# Patient Record
Sex: MALE | Race: WHITE | NOT HISPANIC OR LATINO | URBAN - METROPOLITAN AREA
[De-identification: names, ages, dates, MRNs, and addresses within clinical notes are randomized per-mention and may not be internally consistent; named-entity substitution may affect disease eponyms.]

---

## 2023-04-21 DIAGNOSIS — E11.9 TYPE 2 DIABETES MELLITUS WITHOUT COMPLICATIONS (MULTI): ICD-10-CM

## 2023-04-21 RX ORDER — METFORMIN HYDROCHLORIDE 1000 MG/1
TABLET ORAL
Qty: 180 TABLET | Refills: 3 | Status: SHIPPED | OUTPATIENT
Start: 2023-04-21 | End: 2024-04-22

## 2023-06-12 DIAGNOSIS — I10 ESSENTIAL (PRIMARY) HYPERTENSION: ICD-10-CM

## 2023-06-13 RX ORDER — AMLODIPINE BESYLATE AND ATORVASTATIN CALCIUM 5; 20 MG/1; MG/1
TABLET, FILM COATED ORAL
Qty: 90 TABLET | Refills: 3 | Status: SHIPPED | OUTPATIENT
Start: 2023-06-13

## 2023-07-10 ENCOUNTER — OFFICE VISIT (OUTPATIENT)
Dept: PRIMARY CARE | Facility: CLINIC | Age: 73
End: 2023-07-10
Payer: MEDICARE

## 2023-07-10 VITALS
OXYGEN SATURATION: 94 % | HEART RATE: 75 BPM | BODY MASS INDEX: 35.68 KG/M2 | WEIGHT: 222 LBS | SYSTOLIC BLOOD PRESSURE: 123 MMHG | DIASTOLIC BLOOD PRESSURE: 72 MMHG | TEMPERATURE: 97.9 F | RESPIRATION RATE: 16 BRPM | HEIGHT: 66 IN

## 2023-07-10 DIAGNOSIS — K40.90 LEFT GROIN HERNIA: ICD-10-CM

## 2023-07-10 DIAGNOSIS — E13.9 DIABETES 1.5, MANAGED AS TYPE 2 (MULTI): ICD-10-CM

## 2023-07-10 DIAGNOSIS — Z79.899 HIGH RISK MEDICATION USE: ICD-10-CM

## 2023-07-10 DIAGNOSIS — R53.83 OTHER FATIGUE: ICD-10-CM

## 2023-07-10 DIAGNOSIS — N40.1 BENIGN PROSTATIC HYPERPLASIA WITH NOCTURIA: Primary | ICD-10-CM

## 2023-07-10 DIAGNOSIS — R35.1 BENIGN PROSTATIC HYPERPLASIA WITH NOCTURIA: Primary | ICD-10-CM

## 2023-07-10 DIAGNOSIS — E78.5 DYSLIPIDEMIA: ICD-10-CM

## 2023-07-10 DIAGNOSIS — R25.2 BILATERAL LEG CRAMPS: ICD-10-CM

## 2023-07-10 DIAGNOSIS — J30.2 SEASONAL ALLERGIC RHINITIS, UNSPECIFIED TRIGGER: ICD-10-CM

## 2023-07-10 DIAGNOSIS — M17.0 PRIMARY OSTEOARTHRITIS OF BOTH KNEES: ICD-10-CM

## 2023-07-10 DIAGNOSIS — E03.9 HYPOTHYROIDISM, UNSPECIFIED TYPE: ICD-10-CM

## 2023-07-10 LAB
ALANINE AMINOTRANSFERASE (SGPT) (U/L) IN SER/PLAS: 10 U/L (ref 10–52)
ALBUMIN (G/DL) IN SER/PLAS: 4.5 G/DL (ref 3.4–5)
ALKALINE PHOSPHATASE (U/L) IN SER/PLAS: 69 U/L (ref 33–136)
ANION GAP IN SER/PLAS: 17 MMOL/L (ref 10–20)
ASPARTATE AMINOTRANSFERASE (SGOT) (U/L) IN SER/PLAS: 12 U/L (ref 9–39)
BILIRUBIN TOTAL (MG/DL) IN SER/PLAS: 0.7 MG/DL (ref 0–1.2)
CALCIUM (MG/DL) IN SER/PLAS: 9.6 MG/DL (ref 8.6–10.6)
CARBON DIOXIDE, TOTAL (MMOL/L) IN SER/PLAS: 24 MMOL/L (ref 21–32)
CHLORIDE (MMOL/L) IN SER/PLAS: 102 MMOL/L (ref 98–107)
CHOLESTEROL (MG/DL) IN SER/PLAS: 120 MG/DL (ref 0–199)
CHOLESTEROL IN HDL (MG/DL) IN SER/PLAS: 36.4 MG/DL
CHOLESTEROL/HDL RATIO: 3.3
CREATININE (MG/DL) IN SER/PLAS: 1.22 MG/DL (ref 0.5–1.3)
ERYTHROCYTE DISTRIBUTION WIDTH (RATIO) BY AUTOMATED COUNT: 12.4 % (ref 11.5–14.5)
ERYTHROCYTE MEAN CORPUSCULAR HEMOGLOBIN CONCENTRATION (G/DL) BY AUTOMATED: 32.8 G/DL (ref 32–36)
ERYTHROCYTE MEAN CORPUSCULAR VOLUME (FL) BY AUTOMATED COUNT: 87 FL (ref 80–100)
ERYTHROCYTES (10*6/UL) IN BLOOD BY AUTOMATED COUNT: 5.04 X10E12/L (ref 4.5–5.9)
GFR MALE: 63 ML/MIN/1.73M2
GLUCOSE (MG/DL) IN SER/PLAS: 119 MG/DL (ref 74–99)
HEMATOCRIT (%) IN BLOOD BY AUTOMATED COUNT: 43.9 % (ref 41–52)
HEMOGLOBIN (G/DL) IN BLOOD: 14.4 G/DL (ref 13.5–17.5)
LDL: 59 MG/DL (ref 0–99)
LEUKOCYTES (10*3/UL) IN BLOOD BY AUTOMATED COUNT: 7.1 X10E9/L (ref 4.4–11.3)
NRBC (PER 100 WBCS) BY AUTOMATED COUNT: 0 /100 WBC (ref 0–0)
PLATELETS (10*3/UL) IN BLOOD AUTOMATED COUNT: 253 X10E9/L (ref 150–450)
POC FINGERSTICK BLOOD GLUCOSE: 148 MG/DL (ref 70–100)
POTASSIUM (MMOL/L) IN SER/PLAS: 4.3 MMOL/L (ref 3.5–5.3)
PROTEIN TOTAL: 6.9 G/DL (ref 6.4–8.2)
SODIUM (MMOL/L) IN SER/PLAS: 139 MMOL/L (ref 136–145)
TRIGLYCERIDE (MG/DL) IN SER/PLAS: 125 MG/DL (ref 0–149)
UREA NITROGEN (MG/DL) IN SER/PLAS: 24 MG/DL (ref 6–23)
VLDL: 25 MG/DL (ref 0–40)

## 2023-07-10 PROCEDURE — 82962 GLUCOSE BLOOD TEST: CPT | Performed by: FAMILY MEDICINE

## 2023-07-10 PROCEDURE — 83036 HEMOGLOBIN GLYCOSYLATED A1C: CPT | Performed by: FAMILY MEDICINE

## 2023-07-10 PROCEDURE — 3078F DIAST BP <80 MM HG: CPT | Performed by: FAMILY MEDICINE

## 2023-07-10 PROCEDURE — 80053 COMPREHEN METABOLIC PANEL: CPT

## 2023-07-10 PROCEDURE — 1126F AMNT PAIN NOTED NONE PRSNT: CPT | Performed by: FAMILY MEDICINE

## 2023-07-10 PROCEDURE — 99214 OFFICE O/P EST MOD 30 MIN: CPT | Performed by: FAMILY MEDICINE

## 2023-07-10 PROCEDURE — 85027 COMPLETE CBC AUTOMATED: CPT

## 2023-07-10 PROCEDURE — 3074F SYST BP LT 130 MM HG: CPT | Performed by: FAMILY MEDICINE

## 2023-07-10 PROCEDURE — 1160F RVW MEDS BY RX/DR IN RCRD: CPT | Performed by: FAMILY MEDICINE

## 2023-07-10 PROCEDURE — 1159F MED LIST DOCD IN RCRD: CPT | Performed by: FAMILY MEDICINE

## 2023-07-10 PROCEDURE — 1036F TOBACCO NON-USER: CPT | Performed by: FAMILY MEDICINE

## 2023-07-10 PROCEDURE — 80061 LIPID PANEL: CPT

## 2023-07-10 RX ORDER — TAMSULOSIN HYDROCHLORIDE 0.4 MG/1
0.4 CAPSULE ORAL DAILY
Qty: 30 CAPSULE | Refills: 11 | Status: SHIPPED | OUTPATIENT
Start: 2023-07-10 | End: 2023-07-27

## 2023-07-10 RX ORDER — BLOOD-GLUCOSE METER
EACH MISCELLANEOUS
COMMUNITY
Start: 2023-01-24

## 2023-07-10 RX ORDER — BLOOD SUGAR DIAGNOSTIC
STRIP MISCELLANEOUS
COMMUNITY
End: 2023-10-27

## 2023-07-10 RX ORDER — OLMESARTAN MEDOXOMIL AND HYDROCHLOROTHIAZIDE 20/12.5 20; 12.5 MG/1; MG/1
1 TABLET ORAL DAILY
COMMUNITY
End: 2023-07-24

## 2023-07-10 RX ORDER — LEVOTHYROXINE SODIUM 50 UG/1
50 TABLET ORAL DAILY
COMMUNITY
End: 2024-03-22

## 2023-07-10 RX ORDER — LANCETS
EACH MISCELLANEOUS
COMMUNITY
Start: 2023-05-15 | End: 2024-02-05

## 2023-07-10 ASSESSMENT — PAIN SCALES - GENERAL: PAINLEVEL: 0-NO PAIN

## 2023-07-10 NOTE — PROGRESS NOTES
Subjective   Mack Alarcon is a 73 y.o. male who presents for Follow-up (Patient is diabetic).    HPI  : Patient is a 73-year-old diabetic male who is in for 6-month checkup for her blood sugar and A1c, also complete blood work including lipids and blood counts.  Patient does check his sugar at home and is trying to watch his diet better and he has lost about 6 pounds.  Patient does have a large groin hernia but he states he is not ready to have it fixed yet he is waiting till wintertime.  He also has an elevated PSA and has seen urology for this in the past.  He would like to try taking Flomax to see if it cuts down in his nighttime urination.  Patient's blood pressure has been stable and he will continue watching his diet and trying to lose some weight.  He also has some arthritis in his knees and he gets achy knees but he thinks that is from going up and down steps at home.      Objective  : ROS : 10 systems were reviewed and the information is included in the HPI and no additional review of systems is indicated.    Physical Exam  Vitals and nursing note reviewed.   Constitutional:       Appearance: Normal appearance. He is obese.      Comments: Patient is alert and oriented x3 in no acute distress.   HENT:      Head: Normocephalic and atraumatic.      Right Ear: Tympanic membrane and ear canal normal.      Left Ear: Tympanic membrane and ear canal normal.      Nose: Rhinorrhea present.      Comments: Allergic nasal rhinitis.  Occasionally takes over-the-counter Claritin.     Mouth/Throat:      Mouth: Mucous membranes are dry.      Pharynx: Oropharynx is clear.      Comments: Mouth is dry , tongue is midline, no  posterior pharyngeal erythema  Eyes:      Extraocular Movements: Extraocular movements intact.      Conjunctiva/sclera: Conjunctivae normal.      Pupils: Pupils are equal, round, and reactive to light.      Comments: Patient denies any visual disturbance.  Does have a yearly eye exam.     Neck:       Comments: Patient has a large broad neck.  Denies any cervical adenopathy and no thyromegaly.  No carotid bruits noted.  Cardiovascular:      Rate and Rhythm: Normal rate and regular rhythm.      Heart sounds: Normal heart sounds. No murmur heard.     Comments: Patient denies any chest pain and no palpitations.     Heart rhythm is stable S1 and S2 are noted.  No murmurs or ectopics.   Pulmonary:      Effort: Pulmonary effort is normal.      Breath sounds: Normal breath sounds.      Comments: Lungs are clear to auscultation.       No coughing or wheezing noted.  Abdominal:      General: Bowel sounds are normal.      Palpations: Abdomen is soft.      Hernia: A hernia is present.      Comments: Patient denies any abdominal pain, no rebound tenderness and no guarding.  Denies any flank tenderness.  No suprapubic pain is noted.  Abdomen is soft with positive bowel sounds x4.  Patient does have a large left groin hernia but he does not want to have it repaired at this time.  He wants to wait till the winter.   Genitourinary:     Comments: Patient denies any flank tenderness, no dysuria and no hematuria.   Patient does have nocturia and an elevated PSA level.  He has seen urology in the past and wants to try Flomax.  Musculoskeletal:         General: No swelling or tenderness.      Cervical back: Normal range of motion and neck supple.      Comments: Restricted range of motion cervical , thoracic and lumbar spines.  Patient does have degenerative arthritis in his low back, both hips and knees.  He states his knees ache when he goes up and down the steps too many times.  Normal gait and ambulation.  Arthritis in the hands or fingers.   Patient also gets occasional leg cramps and will try some magnesium.   Skin:     General: Skin is warm and dry.      Capillary Refill: Capillary refill takes less than 2 seconds.      Comments: No bruising or skin lesions noted.  No rashes.   Neurological:      General: No focal deficit  present.      Mental Status: He is alert and oriented to person, place, and time. Mental status is at baseline.      Comments: Patient denies any focal neurologic deficits.      There is normal gait and normal coordination.  No muscle weakness noted.   No sensory deficits noted.  Reflexes are decreased ,  upper and lower extremities.  Patient does have some mild neuropathy in the lower extremities and both feet.   Psychiatric:         Mood and Affect: Mood normal.         Behavior: Behavior normal.         Thought Content: Thought content normal.         Judgment: Judgment normal.      Comments: Patient has normal mood and affect.  Denies any anxiety or depression.       Normal thought content and judgment.  Behavior is normal.   PLAN  : Patient is a 73-year-old diabetic male who was evaluated for complete blood work and also recheck on blood sugar and A1c.  Today his blood sugar was 148 and the A1c was stable at 7.3%.  Patient did lose about 6 pounds from last visit and is trying to start walking more and lose a little bit more weight.  Patient will be notified of all his blood results in 4 days and further recommendations will be made after review of the blood results.  Blood pressure and other vitals were stable.  He will try some Flomax for his BPH with nocturia and he also has an elevated PSA and has seen urology in the past.  Patient otherwise will follow-up in 6 months or sooner as needed.    Problem List Items Addressed This Visit    None           Buzz Vazquez,

## 2023-07-12 LAB — HBA1C MFR BLD: 7.3 % (ref 4.2–6.5)

## 2023-07-15 NOTE — RESULT ENCOUNTER NOTE
Liver function is normal,       kidney function was just a slight bit borderline and he needs to drink more water.       Cholesterol was very good at 120       triglycerides are very good at 125     Red and white blood cell counts are normal      and diabetes is stable.          Blood work looks stable just continue to watch the diet and control his diabetes.

## 2023-07-24 DIAGNOSIS — I10 ESSENTIAL (PRIMARY) HYPERTENSION: ICD-10-CM

## 2023-07-24 RX ORDER — OLMESARTAN MEDOXOMIL AND HYDROCHLOROTHIAZIDE 20/12.5 20; 12.5 MG/1; MG/1
1 TABLET ORAL DAILY
Qty: 90 TABLET | Refills: 2 | Status: SHIPPED | OUTPATIENT
Start: 2023-07-24 | End: 2024-05-20

## 2023-07-26 DIAGNOSIS — R35.1 BENIGN PROSTATIC HYPERPLASIA WITH NOCTURIA: ICD-10-CM

## 2023-07-26 DIAGNOSIS — N40.1 BENIGN PROSTATIC HYPERPLASIA WITH NOCTURIA: ICD-10-CM

## 2023-07-27 RX ORDER — TAMSULOSIN HYDROCHLORIDE 0.4 MG/1
0.4 CAPSULE ORAL DAILY
Qty: 90 CAPSULE | Refills: 4 | Status: SHIPPED | OUTPATIENT
Start: 2023-07-27

## 2023-10-27 DIAGNOSIS — E11.9 TYPE 2 DIABETES MELLITUS WITHOUT COMPLICATIONS (MULTI): ICD-10-CM

## 2023-10-27 RX ORDER — BLOOD SUGAR DIAGNOSTIC
STRIP MISCELLANEOUS
Qty: 200 STRIP | Refills: 3 | Status: SHIPPED | OUTPATIENT
Start: 2023-10-27

## 2024-01-08 ENCOUNTER — OFFICE VISIT (OUTPATIENT)
Dept: PRIMARY CARE | Facility: CLINIC | Age: 74
End: 2024-01-08
Payer: MEDICARE

## 2024-01-08 VITALS
HEIGHT: 66 IN | SYSTOLIC BLOOD PRESSURE: 133 MMHG | HEART RATE: 82 BPM | WEIGHT: 225 LBS | DIASTOLIC BLOOD PRESSURE: 70 MMHG | OXYGEN SATURATION: 94 % | RESPIRATION RATE: 16 BRPM | BODY MASS INDEX: 36.16 KG/M2 | TEMPERATURE: 97.7 F

## 2024-01-08 DIAGNOSIS — E78.5 DYSLIPIDEMIA: ICD-10-CM

## 2024-01-08 DIAGNOSIS — I10 PRIMARY HYPERTENSION: ICD-10-CM

## 2024-01-08 DIAGNOSIS — Z00.00 ROUTINE GENERAL MEDICAL EXAMINATION AT HEALTH CARE FACILITY: Primary | ICD-10-CM

## 2024-01-08 DIAGNOSIS — Z79.899 HIGH RISK MEDICATION USE: ICD-10-CM

## 2024-01-08 DIAGNOSIS — J06.9 URI WITH COUGH AND CONGESTION: ICD-10-CM

## 2024-01-08 DIAGNOSIS — E03.9 ACQUIRED HYPOTHYROIDISM: ICD-10-CM

## 2024-01-08 DIAGNOSIS — E13.9 DIABETES 1.5, MANAGED AS TYPE 2 (MULTI): ICD-10-CM

## 2024-01-08 LAB
ALBUMIN SERPL BCP-MCNC: 4.4 G/DL (ref 3.4–5)
ALP SERPL-CCNC: 62 U/L (ref 33–136)
ALT SERPL W P-5'-P-CCNC: 13 U/L (ref 10–52)
ANION GAP SERPL CALC-SCNC: 15 MMOL/L (ref 10–20)
AST SERPL W P-5'-P-CCNC: 10 U/L (ref 9–39)
BILIRUB SERPL-MCNC: 0.8 MG/DL (ref 0–1.2)
BUN SERPL-MCNC: 23 MG/DL (ref 6–23)
CALCIUM SERPL-MCNC: 9.7 MG/DL (ref 8.6–10.6)
CHLORIDE SERPL-SCNC: 102 MMOL/L (ref 98–107)
CHOLEST SERPL-MCNC: 122 MG/DL (ref 0–199)
CHOLESTEROL/HDL RATIO: 3.1
CO2 SERPL-SCNC: 27 MMOL/L (ref 21–32)
CREAT SERPL-MCNC: 1.24 MG/DL (ref 0.5–1.3)
EGFRCR SERPLBLD CKD-EPI 2021: 62 ML/MIN/1.73M*2
ERYTHROCYTE [DISTWIDTH] IN BLOOD BY AUTOMATED COUNT: 12.6 % (ref 11.5–14.5)
GLUCOSE SERPL-MCNC: 132 MG/DL (ref 74–99)
HBA1C MFR BLD: 7.6 % (ref 4.2–6.5)
HCT VFR BLD AUTO: 44.4 % (ref 41–52)
HDLC SERPL-MCNC: 39.5 MG/DL
HGB BLD-MCNC: 14.8 G/DL (ref 13.5–17.5)
LDLC SERPL CALC-MCNC: 60 MG/DL
MCH RBC QN AUTO: 29 PG (ref 26–34)
MCHC RBC AUTO-ENTMCNC: 33.3 G/DL (ref 32–36)
MCV RBC AUTO: 87 FL (ref 80–100)
NON HDL CHOLESTEROL: 83 MG/DL (ref 0–149)
NRBC BLD-RTO: 0 /100 WBCS (ref 0–0)
PLATELET # BLD AUTO: 241 X10*3/UL (ref 150–450)
POC FINGERSTICK BLOOD GLUCOSE: 159 MG/DL (ref 70–100)
POTASSIUM SERPL-SCNC: 4.3 MMOL/L (ref 3.5–5.3)
PROT SERPL-MCNC: 6.8 G/DL (ref 6.4–8.2)
RBC # BLD AUTO: 5.1 X10*6/UL (ref 4.5–5.9)
SODIUM SERPL-SCNC: 140 MMOL/L (ref 136–145)
TRIGL SERPL-MCNC: 112 MG/DL (ref 0–149)
TSH SERPL-ACNC: 3.15 MIU/L (ref 0.44–3.98)
VLDL: 22 MG/DL (ref 0–40)
WBC # BLD AUTO: 6.8 X10*3/UL (ref 4.4–11.3)

## 2024-01-08 PROCEDURE — 1126F AMNT PAIN NOTED NONE PRSNT: CPT | Performed by: FAMILY MEDICINE

## 2024-01-08 PROCEDURE — 3078F DIAST BP <80 MM HG: CPT | Performed by: FAMILY MEDICINE

## 2024-01-08 PROCEDURE — 82962 GLUCOSE BLOOD TEST: CPT | Performed by: FAMILY MEDICINE

## 2024-01-08 PROCEDURE — 1159F MED LIST DOCD IN RCRD: CPT | Performed by: FAMILY MEDICINE

## 2024-01-08 PROCEDURE — 80061 LIPID PANEL: CPT

## 2024-01-08 PROCEDURE — 3051F HG A1C>EQUAL 7.0%<8.0%: CPT | Performed by: FAMILY MEDICINE

## 2024-01-08 PROCEDURE — G0439 PPPS, SUBSEQ VISIT: HCPCS | Performed by: FAMILY MEDICINE

## 2024-01-08 PROCEDURE — 1036F TOBACCO NON-USER: CPT | Performed by: FAMILY MEDICINE

## 2024-01-08 PROCEDURE — 36415 COLL VENOUS BLD VENIPUNCTURE: CPT

## 2024-01-08 PROCEDURE — 84443 ASSAY THYROID STIM HORMONE: CPT

## 2024-01-08 PROCEDURE — 99214 OFFICE O/P EST MOD 30 MIN: CPT | Performed by: FAMILY MEDICINE

## 2024-01-08 PROCEDURE — 1170F FXNL STATUS ASSESSED: CPT | Performed by: FAMILY MEDICINE

## 2024-01-08 PROCEDURE — 85027 COMPLETE CBC AUTOMATED: CPT

## 2024-01-08 PROCEDURE — 80053 COMPREHEN METABOLIC PANEL: CPT

## 2024-01-08 PROCEDURE — 83036 HEMOGLOBIN GLYCOSYLATED A1C: CPT | Mod: CLIA WAIVED TEST | Performed by: FAMILY MEDICINE

## 2024-01-08 PROCEDURE — 1160F RVW MEDS BY RX/DR IN RCRD: CPT | Performed by: FAMILY MEDICINE

## 2024-01-08 PROCEDURE — 3075F SYST BP GE 130 - 139MM HG: CPT | Performed by: FAMILY MEDICINE

## 2024-01-08 ASSESSMENT — ENCOUNTER SYMPTOMS
LOSS OF SENSATION IN FEET: 0
DEPRESSION: 0
OCCASIONAL FEELINGS OF UNSTEADINESS: 0

## 2024-01-08 ASSESSMENT — ACTIVITIES OF DAILY LIVING (ADL)
BATHING: INDEPENDENT
DRESSING: INDEPENDENT
DRESSING: INDEPENDENT
BATHING: INDEPENDENT

## 2024-01-08 ASSESSMENT — PAIN SCALES - GENERAL: PAINLEVEL: 0-NO PAIN

## 2024-01-08 ASSESSMENT — PATIENT HEALTH QUESTIONNAIRE - PHQ9
SUM OF ALL RESPONSES TO PHQ9 QUESTIONS 1 AND 2: 0
1. LITTLE INTEREST OR PLEASURE IN DOING THINGS: NOT AT ALL
2. FEELING DOWN, DEPRESSED OR HOPELESS: NOT AT ALL

## 2024-01-08 NOTE — PROGRESS NOTES
"Subjective   Reason for Visit: Mack Alarcon is an 73 y.o. male here for a Medicare Wellness visit.        Reviewed all medications by prescribing practitioner or clinical pharmacist (such as prescriptions, OTCs, herbal therapies and supplements) and documented in the medical record.    HPI : Patient is a 73-year-old male who is in for his Medicare wellness subsequent visit.  He is also diabetic and hypertensive and we will be checking his blood sugar, A1c and complete blood work.  He is retired and enjoys his care home time off.  He denies any new problems and does try to watch his weight and his diet.  He also checks his blood sugar at home.  Patient will answer the questions as presented by the medical assistant.  He does have a living will and medical power of .    Patient Care Team:  Buzz Vazquez DO as PCP - General  Buzz Vazquez DO as PCP - [employer] PCP     Review of Systems : 10 systems were reviewed and the information is included in the HPI and no additional review of systems is indicated.    Objective   Vitals:  /70   Pulse 82   Temp 36.5 °C (97.7 °F)   Resp 16   Ht 1.676 m (5' 6\")   Wt 102 kg (225 lb)   SpO2 94%   BMI 36.32 kg/m²       Physical Exam  Vitals and nursing note reviewed.   Constitutional:       Appearance: Normal appearance. He is obese.      Comments: Patient is alert and oriented x3.   No acute distress and does try to follow a diabetic diet.   HENT:      Head: Normocephalic.      Right Ear: Tympanic membrane and external ear normal.      Left Ear: Tympanic membrane and external ear normal.      Ears:      Comments: Ears are patent bilaterally and TMs are clear.     Nose: Congestion present.      Comments: Patient has sinus congestion from a cold and also has postnasal drainage.     Mouth/Throat:      Mouth: Mucous membranes are moist.      Pharynx: Oropharynx is clear.      Comments: Mouth is moist, tongue is midline.  No posterior " pharyngeal erythema.  Eyes:      Extraocular Movements: Extraocular movements intact.      Conjunctiva/sclera: Conjunctivae normal.      Pupils: Pupils are equal, round, and reactive to light.      Comments: No visual disturbance, does have his  eyes examined once a year.   Neck:      Comments: Patient has a broad stout neck, with mild restriction of motion.  No carotid bruits, no thyromegaly, no cervical adenopathy.  Occasional neck spasm and restriction of motion secondary to arthritis, stress and tension.  Cardiovascular:      Rate and Rhythm: Normal rate and regular rhythm.      Pulses: Normal pulses.      Heart sounds: Normal heart sounds.      Comments: Patient denies chest pain and no palpitations.  Heart rhythm is stable S1 and S2 are noted, no ectopics.  Pulmonary:      Effort: Pulmonary effort is normal.      Breath sounds: Rhonchi present.      Comments: Patient has a slight chest cold and does have a mild cough with few scattered rhonchi.  No wheezing is noted the patient is not short of breath.   Abdominal:      General: Bowel sounds are normal.      Palpations: Abdomen is soft.      Comments: Abdomen is soft and mildly obese but non tender.No flank tenderness.  No suprapubic pain.  Positive bowel sounds x4.  No abdominal guarding and no rebound tenderness.   Genitourinary:     Comments: Patient denies dysuria, no hematuria, denies flank pain.  Patient does have nocturia.    Musculoskeletal:         General: Tenderness present.      Cervical back: Normal range of motion.      Comments: Restricted range of motion cervical thoracic and lumbar spines due to degenerative disc disease.  Also has arthritis in his knee joints but ambulates without any assistive device.  Age-related arthritis in the joints.    Skin:     General: Skin is warm and dry.      Comments: There is no bruising, no erythema, no skin lesions noted, no rashes.   Neurological:      General: No focal deficit present.      Mental Status: He  is alert and oriented to person, place, and time. Mental status is at baseline.      Sensory: Sensory deficit present.      Comments: No focal neurosensory deficits are noted.  Mild  peripheral neuropathy.  Does occasionally get some paresthesias from his low back into his legs.  Coordination and gait are stable.  Normal muscle strength upper and lower extremities.   Psychiatric:         Behavior: Behavior normal.         Thought Content: Thought content normal.         Judgment: Judgment normal.      Comments: Patient has flat affect and some anxiety about his diabetes.  Thought content and judgment are stable.  No signs of vascular dementia.  Behavior is normal.     PLAN : Patient is a 73-year-old male who is in for his Medicare wellness subsequent physical.  Today his blood sugar was 159 and his A1c was 7.6%.  It did go up slightly from past 7.3% 4 months ago.  Patient will try to watch his diet better and will start exercising more.  He did answer the Medicare questions as presented by the medical assistant.  He does have a living will and medical power of .  Patient will be notified of all his other blood results in 4 days and further recommendations will be made at that time.  He did have a mild upper respiratory sinus congestion but did not want any medication.  He will follow-up in 4 to 6 months or sooner as needed.    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[facility])     Relevant Orders    CBC    Comprehensive Metabolic Panel    Lipid Panel    POCT fingerstick glucose manually resulted (Completed)    Thyroid Stimulating Hormone    POCT Glycosylated Hemoglobin (HGB A1C) docked device    Hypothyroidism    Relevant Orders    CBC    Comprehensive Metabolic Panel    Lipid Panel    POCT fingerstick glucose manually resulted (Completed)    Thyroid Stimulating Hormone    POCT Glycosylated Hemoglobin (HGB A1C) docked device     Other Visit Diagnoses       Routine general medical examination at health care facility    -  Primary    Primary hypertension        Relevant Orders    CBC    Comprehensive Metabolic Panel    Lipid Panel    POCT fingerstick glucose manually resulted (Completed)    Thyroid Stimulating Hormone    POCT Glycosylated Hemoglobin (HGB A1C) docked device

## 2024-01-11 NOTE — RESULT ENCOUNTER NOTE
Kidney and liver function are stable   cholesterol is very good at 122     .  Triglycerides are normal at 112     thyroid function is stable   Red and white blood cell counts are normal    blood work looks stable    keep up the good work     and watch the diabetes.

## 2024-02-03 DIAGNOSIS — E11.9 TYPE 2 DIABETES MELLITUS WITHOUT COMPLICATIONS (MULTI): ICD-10-CM

## 2024-02-05 RX ORDER — LANCETS
EACH MISCELLANEOUS
Qty: 200 EACH | Refills: 3 | Status: SHIPPED | OUTPATIENT
Start: 2024-02-05

## 2024-03-22 DIAGNOSIS — E03.9 HYPOTHYROIDISM, UNSPECIFIED: ICD-10-CM

## 2024-03-22 RX ORDER — LEVOTHYROXINE SODIUM 50 UG/1
50 TABLET ORAL DAILY
Qty: 90 TABLET | Refills: 3 | Status: SHIPPED | OUTPATIENT
Start: 2024-03-22

## 2024-04-20 DIAGNOSIS — E11.9 TYPE 2 DIABETES MELLITUS WITHOUT COMPLICATIONS (MULTI): ICD-10-CM

## 2024-04-22 RX ORDER — METFORMIN HYDROCHLORIDE 1000 MG/1
TABLET ORAL
Qty: 180 TABLET | Refills: 3 | Status: SHIPPED | OUTPATIENT
Start: 2024-04-22

## 2024-05-06 ENCOUNTER — APPOINTMENT (OUTPATIENT)
Dept: PRIMARY CARE | Facility: CLINIC | Age: 74
End: 2024-05-06
Payer: MEDICARE

## 2024-05-14 ENCOUNTER — OFFICE VISIT (OUTPATIENT)
Dept: PRIMARY CARE | Facility: CLINIC | Age: 74
End: 2024-05-14
Payer: MEDICARE

## 2024-05-14 VITALS
SYSTOLIC BLOOD PRESSURE: 130 MMHG | DIASTOLIC BLOOD PRESSURE: 74 MMHG | HEIGHT: 66 IN | RESPIRATION RATE: 16 BRPM | HEART RATE: 75 BPM | BODY MASS INDEX: 36.48 KG/M2 | OXYGEN SATURATION: 95 % | TEMPERATURE: 97.7 F | WEIGHT: 227 LBS

## 2024-05-14 DIAGNOSIS — E66.01 OBESITY, MORBID (MULTI): ICD-10-CM

## 2024-05-14 DIAGNOSIS — R35.1 BENIGN PROSTATIC HYPERPLASIA WITH NOCTURIA: ICD-10-CM

## 2024-05-14 DIAGNOSIS — N40.1 BENIGN PROSTATIC HYPERPLASIA WITH NOCTURIA: ICD-10-CM

## 2024-05-14 DIAGNOSIS — I10 PRIMARY HYPERTENSION: Primary | ICD-10-CM

## 2024-05-14 DIAGNOSIS — Z79.899 HIGH RISK MEDICATION USE: ICD-10-CM

## 2024-05-14 DIAGNOSIS — K40.90 LEFT GROIN HERNIA: ICD-10-CM

## 2024-05-14 DIAGNOSIS — M17.0 PRIMARY OSTEOARTHRITIS OF BOTH KNEES: ICD-10-CM

## 2024-05-14 DIAGNOSIS — E03.9 ACQUIRED HYPOTHYROIDISM: ICD-10-CM

## 2024-05-14 DIAGNOSIS — E11.9 DIABETES MELLITUS WITHOUT COMPLICATION (MULTI): ICD-10-CM

## 2024-05-14 DIAGNOSIS — E13.9 DIABETES 1.5, MANAGED AS TYPE 2 (MULTI): ICD-10-CM

## 2024-05-14 LAB — POC FINGERSTICK BLOOD GLUCOSE: 164 MG/DL (ref 70–100)

## 2024-05-14 PROCEDURE — 1160F RVW MEDS BY RX/DR IN RCRD: CPT | Performed by: FAMILY MEDICINE

## 2024-05-14 PROCEDURE — 83036 HEMOGLOBIN GLYCOSYLATED A1C: CPT | Mod: CLIA WAIVED TEST | Performed by: FAMILY MEDICINE

## 2024-05-14 PROCEDURE — 99214 OFFICE O/P EST MOD 30 MIN: CPT | Performed by: FAMILY MEDICINE

## 2024-05-14 PROCEDURE — 82962 GLUCOSE BLOOD TEST: CPT | Performed by: FAMILY MEDICINE

## 2024-05-14 PROCEDURE — 3051F HG A1C>EQUAL 7.0%<8.0%: CPT | Performed by: FAMILY MEDICINE

## 2024-05-14 PROCEDURE — 1036F TOBACCO NON-USER: CPT | Performed by: FAMILY MEDICINE

## 2024-05-14 PROCEDURE — 3048F LDL-C <100 MG/DL: CPT | Performed by: FAMILY MEDICINE

## 2024-05-14 PROCEDURE — 3075F SYST BP GE 130 - 139MM HG: CPT | Performed by: FAMILY MEDICINE

## 2024-05-14 PROCEDURE — 1159F MED LIST DOCD IN RCRD: CPT | Performed by: FAMILY MEDICINE

## 2024-05-14 PROCEDURE — 1126F AMNT PAIN NOTED NONE PRSNT: CPT | Performed by: FAMILY MEDICINE

## 2024-05-14 PROCEDURE — 3078F DIAST BP <80 MM HG: CPT | Performed by: FAMILY MEDICINE

## 2024-05-14 ASSESSMENT — PATIENT HEALTH QUESTIONNAIRE - PHQ9
1. LITTLE INTEREST OR PLEASURE IN DOING THINGS: NOT AT ALL
SUM OF ALL RESPONSES TO PHQ9 QUESTIONS 1 AND 2: 0
2. FEELING DOWN, DEPRESSED OR HOPELESS: NOT AT ALL

## 2024-05-14 ASSESSMENT — PAIN SCALES - GENERAL: PAINLEVEL: 0-NO PAIN

## 2024-05-14 NOTE — PROGRESS NOTES
Subjective   Mack Alarcon is a 74 y.o. male who presents for Follow-up (Follow up visit for diabetes/).    HPI  : Patient is a 74-year-old diabetic male who is in for recheck on his blood sugar and A1c.  He recently underwent evaluation by urology for his elevated PSA and the results were negative for any cancer.  He needs to schedule his left inguinal herniorrhaphy since he has a large left groin hernia that needs to be repaired.  He states he will call the surgeon but he wants to have the procedure done at [facility].  Patient also has hypertension and suffers with obesity.  He states now that the weather has improved he is starting to walk more and is hopefully going to lose some weight.      Objective  : ROS : 10 systems were reviewed and the information is included in the HPI and no additional review of systems is indicated.    Physical Exam  Vitals and nursing note reviewed.   Constitutional:       Appearance: Normal appearance. He is obese.      Comments: Patient is alert and oriented x3.   No acute distress and patient states he will start walking again that the weather has improved.   HENT:      Head: Normocephalic.      Right Ear: Tympanic membrane and external ear normal.      Left Ear: Tympanic membrane and external ear normal.      Ears:      Comments: Ears are patent bilaterally and TMs are clear.     Nose: Nose normal.      Mouth/Throat:      Mouth: Mucous membranes are moist.      Pharynx: Oropharynx is clear.      Comments: Mouth is moist, tongue is midline.  No posterior pharyngeal erythema.  Eyes:      Extraocular Movements: Extraocular movements intact.      Conjunctiva/sclera: Conjunctivae normal.      Pupils: Pupils are equal, round, and reactive to light.      Comments: No visual disturbance, does have his  eyes examined once a year.   Neck:      Comments: Large broad neck with restriction of motion.  No carotid bruits, no thyromegaly, no cervical adenopathy.    Cardiovascular:       Rate and Rhythm: Normal rate and regular rhythm.      Pulses: Normal pulses.      Heart sounds: Normal heart sounds.      Comments: Patient denies chest pain and no palpitations.  Heart rhythm is stable S1 and S2 are noted, no ectopics.  Pulmonary:      Effort: Pulmonary effort is normal.      Breath sounds: Normal breath sounds.      Comments: Patient denies shortness of breath.  Patient denies any coughing or wheezing.  Lungs are clear to auscultation.    Abdominal:      General: Bowel sounds are normal.      Palpations: Abdomen is soft.      Comments: Abdomen is soft and obese and difficult to evaluate.  He did have a recent CT scan which shows his large left groin hernia and he does need to have that fixed ASAP.  He will call the surgeon that he was given at [facility].  Patient also had recent urologic evaluation by [facility] urology for elevated PSA.   Genitourinary:     Comments: Patient has a large left groin hernia.  He was recently evaluated by urology for elevated PSA and there are  no signs of cancer.  He will be scheduling his left groin hernia surgery soon.  He does want to go to [facility] for the surgery.  Musculoskeletal:         General: Tenderness present. Normal range of motion.      Cervical back: Normal range of motion. Tenderness present.      Comments: Arthritis in both his knees.  Chronic lumbosacral degenerative disc disease but denies any current paresthesias.  Age-related arthritis in his other joints.   Skin:     General: Skin is warm and dry.      Comments: There is no bruising, no erythema, no skin lesions noted, no rashes.   Neurological:      General: No focal deficit present.      Mental Status: He is alert and oriented to person, place, and time. Mental status is at baseline.      Sensory: Sensory deficit present.      Comments: No focal neurosensory deficits are noted.  Mild diabetic peripheral neuropathy in the lower legs and feet.  Coordination and gait are  stable.  Normal muscle strength upper and lower extremities.   Psychiatric:         Mood and Affect: Mood normal.         Behavior: Behavior normal.         Thought Content: Thought content normal.         Judgment: Judgment normal.      Comments: Patient has normal mood and affect.  Thought content and judgment are stable.  No signs of vascular dementia.  Behavior is normal.     PLAN : Patient is a 74-year-old diabetic male who was evaluated today for recheck on blood pressure, vitals and A1c.  Today his sugar was 164 and the A1c did go up to 7.8% and previously it was 7.6%.  He states that now that the weather has improved he will start walking outdoors again and we will try to lose 10 pounds with this.  His blood pressure and vitals were stable and we did review his medications.  He also had recent evaluation by urology for his elevated PSA and there were no signs of prostate cancer.  He does need to have his left groin hernia repaired and he will be calling his surgeon at [facility] very soon.  This is a large left groin hernia and he needs to have it fixed ASAP.  Patient is otherwise stable and will follow-up in 4 months.    Problem List Items Addressed This Visit    None  Visit Diagnoses       Diabetes mellitus without complication (Multi)                     Buzz Vazquez DO

## 2024-05-15 LAB — HBA1C MFR BLD: 7.8 % (ref 4.2–6.5)

## 2024-05-18 DIAGNOSIS — E11.9 TYPE 2 DIABETES MELLITUS WITHOUT COMPLICATIONS (MULTI): ICD-10-CM

## 2024-05-18 DIAGNOSIS — I10 ESSENTIAL (PRIMARY) HYPERTENSION: ICD-10-CM

## 2024-05-20 RX ORDER — OLMESARTAN MEDOXOMIL AND HYDROCHLOROTHIAZIDE 20/12.5 20; 12.5 MG/1; MG/1
1 TABLET ORAL DAILY
Qty: 90 TABLET | Refills: 2 | Status: SHIPPED | OUTPATIENT
Start: 2024-05-20

## 2024-05-20 RX ORDER — EMPAGLIFLOZIN 25 MG/1
TABLET, FILM COATED ORAL
Qty: 90 TABLET | Refills: 3 | Status: SHIPPED | OUTPATIENT
Start: 2024-05-20 | End: 2024-08-18

## 2024-05-23 ENCOUNTER — OFFICE VISIT (OUTPATIENT)
Dept: SURGERY | Facility: CLINIC | Age: 74
End: 2024-05-23
Payer: MEDICARE

## 2024-05-23 DIAGNOSIS — K40.30 INCARCERATED RIGHT INGUINAL HERNIA: ICD-10-CM

## 2024-05-23 DIAGNOSIS — K40.30 INCARCERATED LEFT INGUINAL HERNIA: Primary | ICD-10-CM

## 2024-05-23 PROCEDURE — 99204 OFFICE O/P NEW MOD 45 MIN: CPT | Performed by: PHYSICIAN ASSISTANT

## 2024-05-23 PROCEDURE — 1160F RVW MEDS BY RX/DR IN RCRD: CPT | Performed by: PHYSICIAN ASSISTANT

## 2024-05-23 PROCEDURE — 3051F HG A1C>EQUAL 7.0%<8.0%: CPT | Performed by: PHYSICIAN ASSISTANT

## 2024-05-23 PROCEDURE — 3048F LDL-C <100 MG/DL: CPT | Performed by: PHYSICIAN ASSISTANT

## 2024-05-23 PROCEDURE — 1159F MED LIST DOCD IN RCRD: CPT | Performed by: PHYSICIAN ASSISTANT

## 2024-05-23 PROCEDURE — 1036F TOBACCO NON-USER: CPT | Performed by: PHYSICIAN ASSISTANT

## 2024-05-23 NOTE — PROGRESS NOTES
Subjective   Patient ID: Mack Alarcon is a 74 y.o. male who presents for New Patient Visit and Hernia (Left inguinal).    HPI  Is a 74-year-old gentleman who comes in with large bilateral inguinal hernias.  He has had them for more than 15 years they just keep getting bigger and bigger he finally saw his urologist because of some prostate issue is his PSA was elevated prostate biopsy done negative.  He was found to have 2 very large hernias his left side is 15 cm or greater in into the left hemiscrotum containing a large loop of sigmoid colon.  The right side is around 6 cm.  The discomfort he gets is from the weight of the hernia.Review of systems is negative other than what is mentioned above      Review of Systems    Review of systems is negative other than what is mentioned above'    Physical Exam  Eyes: Conjunctiva non -icteric and eye lids are without obvious rash or drooping. Pupils are symmetric.   Ears, Nose, Mouth, and Throat: External ears and nose appear to be without deformity or rash. No lesions or masses noted. Hearing is grossly intact.   Neck:. No JVD noted, tracheal position is midline. No thyromegaly, no thyroid nodules  Head and Face: Examination of the head and face revealed no abnormalities.   Respiratory: No gasping or shortness of breath noted, no use of accessory muscles noted.  Lungs are clear to auscultate  Cardiovascular: Examination for edema is normal, regular rate and rhythm S1-S2  GI: Abdomen non tender to palpation, bowel sounds are present  Inguinal:.  Patient has a very large left incarcerated inguinal hernia into the scrotum.  He also has a right-sided incarcerated hernia.  Testes cannot be palpated due to the size of the hernia.  Skin: No rashes or open lesions/ulcers identified on skin.   Musk: Digits/nails show no clubbing or cyanosis. No asymmetry or masses noted of the musculature. Examination of the muscles/joints/bones show normal range of motion. Gait is grossly normally.    Neurologic: Cranial nerves II- XII intact, motor strength 5/5 muscle strength of the lower extremities bilaterally and equal.    Objective     No diagnosis found.   Patient Active Problem List   Diagnosis    Benign prostatic hyperplasia with nocturia    Dyslipidemia    High risk medication use    Diabetes 1.5, managed as type 2 (Multi)    Other fatigue    Bilateral leg cramps    Primary osteoarthritis of both knees    Hypothyroidism    Seasonal allergic rhinitis    Left groin hernia    Primary hypertension    Routine general medical examination at health care facility    URI with cough and congestion    Obesity, morbid (Multi)      No Known Allergies   Medication Documentation Review Audit       Reviewed by Radha Coleman MA (Medical Assistant) on 24 at 1328      Medication Order Taking? Sig Documenting Provider Last Dose Status   amlodipine-atorvastatin (Caduet) 5-20 mg tablet [id number] No One daily Buzz Vazquez DO Taking Active   Contour Next Meter misc [id number] No use as directed. Historical Provider, MD Taking Active   Contour Next Test Strips strip [id number] No USE TO TEST TWICE A DAY Buzz Vazquez DO Taking Active   Discontinued 24 1026   glimepiride (Amaryl) 4 mg tablet [id number] No TAKE 1.5 TABLETS BY MOUTH EVERY DAY Buzz Vazquez DO Taking  23 2359   Jardiance 25 mg [id number]  TAKE 1 TABLET BY MOUTH EVERY DAY Buzz Vazquez DO  Active   lancets (Microlet Lancet) [facility] [id number] No TEST TWICE PER DAY Buzz Vazquez DO Taking Active   levothyroxine (Synthroid, Levoxyl) 50 mcg tablet [id number] No TAKE 1 TABLET BY MOUTH EVERY DAY Buzz Vazquez DO Taking Active   metFORMIN (Glucophage) 1,000 mg tablet [id number] No TAKE 1 TABLET BY MOUTH TWICE A DAY Buzz Vazquez DO Taking Active   Discontinued 24 1026   olmesartan-hydrochlorothiazide (BENIcar HCT) 20-12.5 mg tablet [id number]  TAKE 1 TABLET BY MOUTH EVERY DAY Buzz Vazquez DO  Active    tamsulosin (Flomax) 0.4 mg 24 hr capsule [id number] No TAKE 1 CAPSULE BY MOUTH ONCE DAILY. Buzz Vazquez DO Taking Active                    Past Medical History:   Diagnosis Date    Personal history of other endocrine, nutritional and metabolic disease 06/29/2020    History of hyperthyroidism     Social History     Tobacco Use   Smoking Status Never    Passive exposure: Never   Smokeless Tobacco Never     No family history on file.   History reviewed. No pertinent surgical history.    Assessment/Plan   Today we had a discussion about robotic assisted  bilateral inguinal hernia repair with mesh.  Patient's left inguinal hernia is greater than 15 cm and is in to the scrotum he has hemiscrotum.  The right side is around patient was informed that this is an outpatient surgery.  The surgery takes 2 hours.  There will be 3 small incisions or possible opened ,  requires a general anesthesia.  Patient will need a ride to and from the hospital.  Risk and benefits such as bleeding and infection were discussed as well.  Surgeries were described in detail.  Patient had complete understanding.  All questions were answered.  Patient would like to proceed.     I did discuss with the patient in detail about the size of his hernias and informed him that the defect into his scrotum will not be reduced after the surgery and he would most likely develop a seroma and they would possibly have to be tapped multiple times.      Encounter Diagnoses   Name Primary?    Incarcerated left inguinal hernia Yes    Incarcerated right inguinal hernia      I have reviewed all data including labs,radiologic and previous reports.     **Portions of this medical record have been created using voice recognition software and may have minor errors which are inherent in voice recognition systems. It has not been fully edited for typographical or grammatical errors**

## 2024-05-31 LAB
NON-UH HIE POC GLUCOSE: 169 MG/DL (ref 72–100)
NON-UH HIE POC GLUCOSE: 202 MG/DL (ref 72–100)

## 2024-06-14 ENCOUNTER — APPOINTMENT (OUTPATIENT)
Dept: SURGERY | Facility: CLINIC | Age: 74
End: 2024-06-14
Payer: MEDICARE

## 2024-06-14 DIAGNOSIS — Z09 STATUS POST INGUINAL HERNIA REPAIR, FOLLOW-UP EXAM: Primary | ICD-10-CM

## 2024-06-14 PROCEDURE — 3048F LDL-C <100 MG/DL: CPT | Performed by: PHYSICIAN ASSISTANT

## 2024-06-14 PROCEDURE — 99024 POSTOP FOLLOW-UP VISIT: CPT | Performed by: PHYSICIAN ASSISTANT

## 2024-06-14 PROCEDURE — 3051F HG A1C>EQUAL 7.0%<8.0%: CPT | Performed by: PHYSICIAN ASSISTANT

## 2024-06-14 PROCEDURE — 1159F MED LIST DOCD IN RCRD: CPT | Performed by: PHYSICIAN ASSISTANT

## 2024-06-14 PROCEDURE — 1036F TOBACCO NON-USER: CPT | Performed by: PHYSICIAN ASSISTANT

## 2024-06-14 NOTE — PROGRESS NOTES
Subjective   Patient ID: Mack Alarcon is a 74 y.o. male who presents for Post-op (Robotic Left inguinal hernia repair done on 5/31/24).    HPI  74-year-old gentleman 2 weeks status post robotic left inguinal large hernia repair.  Patient does not have any pain.  No abdominal pain.  He is moving his bowels.  He is urinating.  He states he does have some fluid filling up in his scrotum.  Patient had a large dead space due to the size of the hernia.    Review of Systems  Review of systems is negative other than what is mentioned above        Physical Exam  Eyes: Conjunctiva non -icteric and eye lids are without obvious rash or drooping. Pupils are symmetric.   Ears, Nose, Mouth, and Throat: External ears and nose appear to be without deformity or rash. No lesions or masses noted. Hearing is grossly intact.   Neck:. No JVD noted, tracheal position is midline. No thyromegaly, no thyroid nodules  Head and Face: Examination of the head and face revealed no abnormalities.   Respiratory: No gasping or shortness of breath noted, no use of accessory muscles noted.  Lungs are clear to auscultate  Cardiovascular: Examination for edema is normal, regular rate and rhythm S1-S2  GI: Abdomen non tender to palpation, bowel sounds are present  Inguinal: The hernia is reduced there is still some largeness of the scrotum that was there prior to the surgery.  There may be a seroma there.  Patient does not feel any pain.  He is voiding normally.  Skin: No rashes or open lesions/ulcers identified on skin.   Musk: Digits/nails show no clubbing or cyanosis. No asymmetry or masses noted of the musculature. Examination of the muscles/joints/bones show normal range of motion. Gait is grossly normally.   Neurologic: Cranial nerves II- XII intact, motor strength 5/5 muscle strength of the lower extremities bilaterally and equal.    Objective     No diagnosis found.   Patient Active Problem List   Diagnosis    Benign prostatic hyperplasia with  nocturia    Dyslipidemia    High risk medication use    Diabetes 1.5, managed as type 2 (Multi)    Other fatigue    Bilateral leg cramps    Primary osteoarthritis of both knees    Hypothyroidism    Seasonal allergic rhinitis    Left groin hernia    Primary hypertension    Routine general medical examination at health care facility    URI with cough and congestion    Obesity, morbid (Multi)      No Known Allergies   Medication Documentation Review Audit       Reviewed by Radha Coleman MA (Medical Assistant) on 24 at 0753      Medication Order Taking? Sig Documenting Provider Last Dose Status   amlodipine-atorvastatin (Caduet) 5-20 mg tablet [id number] No One daily Buzz Vazquez DO Taking Active   Contour Next Meter misc [id number] No use as directed. Historical Provider, MD Taking Active   Contour Next Test Strips strip [id number] No USE TO TEST TWICE A DAY Buzz Vazquez DO Taking Active   glimepiride (Amaryl) 4 mg tablet [id number] No TAKE 1.5 TABLETS BY MOUTH EVERY DAY Buzz Vazquez DO Taking  23 2359   Jardiance 25 mg [id number]  TAKE 1 TABLET BY MOUTH EVERY DAY Buzz Vazquez DO  Active   lancets (Microlet [facility]et) [facility] [id number] No TEST TWICE PER DAY Buzz Vazquez DO Taking Active   levothyroxine (Synthroid, Levoxyl) 50 mcg tablet [id number] No TAKE 1 TABLET BY MOUTH EVERY DAY Buzz Vazquez DO Taking Active   metFORMIN (Glucophage) 1,000 mg tablet [id number] No TAKE 1 TABLET BY MOUTH TWICE A DAY Buzz Vazquez DO Taking Active   olmesartan-hydrochlorothiazide (BENIcar HCT) 20-12.5 mg tablet [id number]  TAKE 1 TABLET BY MOUTH EVERY DAY Buzz Vazquez DO  Active   tamsulosin (Flomax) 0.4 mg 24 hr capsule [id number] No TAKE 1 CAPSULE BY MOUTH ONCE DAILY. Buzz Vazquez DO Taking Active                    Past Medical History:   Diagnosis Date    Personal history of other endocrine, nutritional and metabolic disease 2020    History of hyperthyroidism      Social History     Tobacco Use   Smoking Status Never    Passive exposure: Never   Smokeless Tobacco Never     No family history on file.   History reviewed. No pertinent surgical history.    Assessment/Plan   No lifting over 10 to 12 pounds for 4 weeks  No swimming pools hot tubs or lakes for 2 weeks  You may ride a stationary bike, you may use a treadmill, you may walk outside.  No squats, sit ups or lunges  You may drive a car  Follow-up as needed     Patient and wife were informed that he does have some fluid in the dead space that was there from the large inguinal hernia.  They were encouraged to allow this to reabsorb on its own.  I told him it could take anywhere from 6 months or so.  I encouraged the patient to go purchase a scrotal supporter and he needs to wear at night and day in order to get the seroma to reabsorb and he needs to keep his scrotum elevated.  If it continues to be a problem they can return to the office we may consider a FNA guided ultrasound.    Encounter Diagnosis   Name Primary?    Status post inguinal hernia repair, follow-up exam Yes     I have reviewed all data including labs,radiologic and previous reports.        **Portions of this medical record have been created using voice recognition software and may have minor errors which are inherent in voice recognition systems. It has not been fully edited for typographical or grammatical errors**

## 2024-06-29 DIAGNOSIS — I10 ESSENTIAL (PRIMARY) HYPERTENSION: ICD-10-CM

## 2024-07-01 RX ORDER — AMLODIPINE BESYLATE AND ATORVASTATIN CALCIUM 5; 20 MG/1; MG/1
TABLET, FILM COATED ORAL
Qty: 90 TABLET | Refills: 3 | Status: SHIPPED | OUTPATIENT
Start: 2024-07-01

## 2024-09-16 ENCOUNTER — APPOINTMENT (OUTPATIENT)
Dept: PRIMARY CARE | Facility: CLINIC | Age: 74
End: 2024-09-16
Payer: MEDICARE

## 2024-09-16 VITALS
HEIGHT: 66 IN | SYSTOLIC BLOOD PRESSURE: 122 MMHG | OXYGEN SATURATION: 93 % | TEMPERATURE: 97.9 F | WEIGHT: 218 LBS | DIASTOLIC BLOOD PRESSURE: 77 MMHG | RESPIRATION RATE: 16 BRPM | BODY MASS INDEX: 35.03 KG/M2 | HEART RATE: 80 BPM

## 2024-09-16 DIAGNOSIS — E11.65 UNCONTROLLED TYPE 2 DIABETES MELLITUS WITH HYPERGLYCEMIA: Primary | ICD-10-CM

## 2024-09-16 DIAGNOSIS — R97.20 ELEVATED PSA: ICD-10-CM

## 2024-09-16 DIAGNOSIS — M17.0 PRIMARY OSTEOARTHRITIS OF BOTH KNEES: ICD-10-CM

## 2024-09-16 DIAGNOSIS — E78.5 DYSLIPIDEMIA: ICD-10-CM

## 2024-09-16 DIAGNOSIS — Z12.5 SCREENING PSA (PROSTATE SPECIFIC ANTIGEN): ICD-10-CM

## 2024-09-16 DIAGNOSIS — E66.01 OBESITY, MORBID (MULTI): ICD-10-CM

## 2024-09-16 DIAGNOSIS — E13.9 DIABETES 1.5, MANAGED AS TYPE 2 (MULTI): ICD-10-CM

## 2024-09-16 DIAGNOSIS — E03.9 ACQUIRED HYPOTHYROIDISM: ICD-10-CM

## 2024-09-16 LAB
HBA1C MFR BLD: 8.5 % (ref 4.2–6.5)
POC FINGERSTICK BLOOD GLUCOSE: 218 MG/DL (ref 70–100)
PSA SERPL-MCNC: 7.87 NG/ML

## 2024-09-16 PROCEDURE — 1124F ACP DISCUSS-NO DSCNMKR DOCD: CPT | Performed by: FAMILY MEDICINE

## 2024-09-16 PROCEDURE — 1159F MED LIST DOCD IN RCRD: CPT | Performed by: FAMILY MEDICINE

## 2024-09-16 PROCEDURE — 82962 GLUCOSE BLOOD TEST: CPT | Performed by: FAMILY MEDICINE

## 2024-09-16 PROCEDURE — 83036 HEMOGLOBIN GLYCOSYLATED A1C: CPT | Mod: CLIA WAIVED TEST | Performed by: FAMILY MEDICINE

## 2024-09-16 PROCEDURE — 3074F SYST BP LT 130 MM HG: CPT | Performed by: FAMILY MEDICINE

## 2024-09-16 PROCEDURE — 3008F BODY MASS INDEX DOCD: CPT | Performed by: FAMILY MEDICINE

## 2024-09-16 PROCEDURE — 1160F RVW MEDS BY RX/DR IN RCRD: CPT | Performed by: FAMILY MEDICINE

## 2024-09-16 PROCEDURE — 99214 OFFICE O/P EST MOD 30 MIN: CPT | Performed by: FAMILY MEDICINE

## 2024-09-16 PROCEDURE — 1036F TOBACCO NON-USER: CPT | Performed by: FAMILY MEDICINE

## 2024-09-16 PROCEDURE — 3078F DIAST BP <80 MM HG: CPT | Performed by: FAMILY MEDICINE

## 2024-09-16 PROCEDURE — 3052F HG A1C>EQUAL 8.0%<EQUAL 9.0%: CPT | Performed by: FAMILY MEDICINE

## 2024-09-16 PROCEDURE — 3048F LDL-C <100 MG/DL: CPT | Performed by: FAMILY MEDICINE

## 2024-09-16 PROCEDURE — 1126F AMNT PAIN NOTED NONE PRSNT: CPT | Performed by: FAMILY MEDICINE

## 2024-09-16 PROCEDURE — G0103 PSA SCREENING: HCPCS

## 2024-09-16 ASSESSMENT — PATIENT HEALTH QUESTIONNAIRE - PHQ9
2. FEELING DOWN, DEPRESSED OR HOPELESS: NOT AT ALL
1. LITTLE INTEREST OR PLEASURE IN DOING THINGS: NOT AT ALL
SUM OF ALL RESPONSES TO PHQ9 QUESTIONS 1 AND 2: 0
2. FEELING DOWN, DEPRESSED OR HOPELESS: NOT AT ALL
1. LITTLE INTEREST OR PLEASURE IN DOING THINGS: NOT AT ALL
SUM OF ALL RESPONSES TO PHQ9 QUESTIONS 1 AND 2: 0

## 2024-09-16 ASSESSMENT — PAIN SCALES - GENERAL: PAINLEVEL: 0-NO PAIN

## 2024-09-16 NOTE — PROGRESS NOTES
Subjective   Mack Alarcon is a 74 y.o. male who presents for Follow-up (Follow up visit following hernia surgery).    HPI  : Patient is a 74-year-old diabetic male who is in for recheck on his blood sugar and A1c, also PSA level and blood pressure.  He had a left inguinal hernia surgery on May 31 at [facility] and currently is stable.  He has recovered well and it was done as a laparoscopic procedure.  He also had a previous prostate biopsy by Dr. Conti, and the biopsy was negative but he does have an elevated PSA that needs to be monitored.  Patient did cut back to 1 glimepiride daily and he will increase it back to 1-1/2 daily.    Objective  : ROS : 10 systems were reviewed and the information is included in the HPI and no additional review of systems is indicated.    Physical Exam  Vitals and nursing note reviewed.   Constitutional:       Appearance: Normal appearance. He is obese.      Comments: Patient is alert and oriented x3.   No acute distress   HENT:      Head: Normocephalic.      Right Ear: Tympanic membrane and external ear normal.      Left Ear: Tympanic membrane and external ear normal.      Ears:      Comments: Ears are patent bilaterally and TMs are clear.     Nose: Nose normal.      Mouth/Throat:      Mouth: Mucous membranes are moist.      Pharynx: Oropharynx is clear.      Comments: Mouth is moist, tongue is midline.  No posterior pharyngeal erythema.  Eyes:      Extraocular Movements: Extraocular movements intact.      Conjunctiva/sclera: Conjunctivae normal.      Pupils: Pupils are equal, round, and reactive to light.      Comments: No visual disturbance, does have her eyes examined once a year.   Neck:      Comments: Short broad neck with restriction of motion due to body habitus.  No carotid bruits, no thyromegaly, no cervical adenopathy.    Cardiovascular:      Rate and Rhythm: Normal rate and regular rhythm.      Pulses: Normal pulses.      Heart sounds: Normal heart sounds.       Comments: Patient denies chest pain and no palpitations.  Heart rhythm is stable S1 and S2 are noted, no ectopics.  Pulmonary:      Effort: Pulmonary effort is normal.      Comments: Patient denies any coughing or wheezing.  Lungs are clear to auscultation.  Patient quit smoking many years ago.  Abdominal:      General: Bowel sounds are normal.      Palpations: Abdomen is soft.      Hernia: A hernia (Post op left groin hernia repair 05/31/2024.) is present.      Comments: Abdomen is soft and non tender. No flank tenderness.  No suprapubic pain.  Positive bowel sounds .  No abdominal guarding and no rebound tenderness.   Genitourinary:     Comments: Patient denies dysuria, no hematuria, no nocturia, denies flank pain.  Musculoskeletal:         General: Tenderness present.      Cervical back: Normal range of motion.      Comments: Patient has DJD of both knees, is somewhat bowlegged.  Does have chronic lumbosacral arthritis.  No ankle edema.   Skin:     General: Skin is warm and dry.      Comments: There is no bruising, no erythema, no skin lesions noted, no rashes.   Neurological:      General: No focal deficit present.      Mental Status: He is alert and oriented to person, place, and time. Mental status is at baseline.      Sensory: Sensory deficit present.      Comments: No focal neurosensory deficits are noted.  Patient has mild diabetic peripheral neuropathy.  Occasional paresthesias lower extremities from lumbosacral disc disease.  Coordination and gait are stable.  Normal muscle strength upper and lower extremities.   Psychiatric:         Mood and Affect: Mood normal.         Behavior: Behavior normal.         Thought Content: Thought content normal.         Judgment: Judgment normal.      Comments: Patient has flat  mood and affect.  Thought content and judgment are stable.  Occasional age-related forgetfulness.  Behavior is normal.     PLAN : Patient is a 74-year-old diabetic male who is in for recheck on  his blood sugar and A1c.  He also needed a repeat PSA as requested by urology.  His blood sugar was 168 and his A1c went up to 8.5% when last time it was 7.8%.  He actually has lost weight but he states he has been eating more bread and desserts.  He knows he has to get back on his diet and lower his A1c.  He also had hernia surgery for a left inguinal hernia in May and he has not been as active since that surgery.  Patient will be notified of his PSA result and he will follow-up in 4 to 6 months for recheck on his diabetes.  We did review his diet with the patient and his wife and he will try to watch his desserts and pastries.    Problem List Items Addressed This Visit       Diabetes 1.5, managed as type 2 (Multi)    Relevant Orders    POCT fingerstick glucose manually resulted    POCT Glycosylated Hemoglobin (HGB A1C) docked device    Elevated PSA - Primary     Other Visit Diagnoses       Screening PSA (prostate specific antigen)        Relevant Orders    Prostate Specific Antigen, Screen                 Buzz Vazquez,

## 2024-09-19 NOTE — RESULT ENCOUNTER NOTE
Prostate level is 7.87     he should talk to the urologist about this.   I know he had a negative biopsy.     Just continue to watch the diabetes and the diet.

## 2024-09-30 DIAGNOSIS — E11.9 TYPE 2 DIABETES MELLITUS WITHOUT COMPLICATIONS (MULTI): ICD-10-CM

## 2024-09-30 RX ORDER — GLIMEPIRIDE 4 MG/1
6 TABLET ORAL DAILY
Qty: 135 TABLET | Refills: 3 | Status: SHIPPED | OUTPATIENT
Start: 2024-09-30

## 2024-12-16 DIAGNOSIS — E11.9 TYPE 2 DIABETES MELLITUS WITHOUT COMPLICATIONS (MULTI): ICD-10-CM

## 2024-12-16 DIAGNOSIS — I10 ESSENTIAL (PRIMARY) HYPERTENSION: ICD-10-CM

## 2024-12-17 RX ORDER — BLOOD SUGAR DIAGNOSTIC
STRIP MISCELLANEOUS
Qty: 200 STRIP | Refills: 3 | Status: SHIPPED | OUTPATIENT
Start: 2024-12-17

## 2024-12-17 RX ORDER — OLMESARTAN MEDOXOMIL AND HYDROCHLOROTHIAZIDE 20/12.5 20; 12.5 MG/1; MG/1
1 TABLET ORAL DAILY
Qty: 90 TABLET | Refills: 2 | Status: SHIPPED | OUTPATIENT
Start: 2024-12-17

## 2025-01-20 ENCOUNTER — APPOINTMENT (OUTPATIENT)
Dept: PRIMARY CARE | Facility: CLINIC | Age: 75
End: 2025-01-20
Payer: MEDICARE

## 2025-03-08 DIAGNOSIS — E03.9 HYPOTHYROIDISM, UNSPECIFIED: ICD-10-CM

## 2025-03-08 DIAGNOSIS — E11.9 TYPE 2 DIABETES MELLITUS WITHOUT COMPLICATIONS (MULTI): ICD-10-CM

## 2025-03-11 RX ORDER — EMPAGLIFLOZIN 25 MG/1
TABLET, FILM COATED ORAL
Qty: 90 TABLET | Refills: 3 | Status: SHIPPED | OUTPATIENT
Start: 2025-03-11 | End: 2025-06-09

## 2025-03-11 RX ORDER — LEVOTHYROXINE SODIUM 50 UG/1
50 TABLET ORAL DAILY
Qty: 90 TABLET | Refills: 3 | Status: SHIPPED | OUTPATIENT
Start: 2025-03-11

## 2025-03-11 RX ORDER — METFORMIN HYDROCHLORIDE 1000 MG/1
TABLET ORAL
Qty: 180 TABLET | Refills: 3 | Status: SHIPPED | OUTPATIENT
Start: 2025-03-11

## 2025-04-21 ENCOUNTER — APPOINTMENT (OUTPATIENT)
Dept: PRIMARY CARE | Facility: CLINIC | Age: 75
End: 2025-04-21
Payer: MEDICARE

## 2025-04-21 VITALS
HEIGHT: 66 IN | BODY MASS INDEX: 35.52 KG/M2 | WEIGHT: 221 LBS | DIASTOLIC BLOOD PRESSURE: 75 MMHG | HEART RATE: 75 BPM | TEMPERATURE: 97.9 F | RESPIRATION RATE: 16 BRPM | OXYGEN SATURATION: 96 % | SYSTOLIC BLOOD PRESSURE: 120 MMHG

## 2025-04-21 DIAGNOSIS — N40.0 BPH WITH ELEVATED PSA: ICD-10-CM

## 2025-04-21 DIAGNOSIS — E11.65 UNCONTROLLED TYPE 2 DIABETES MELLITUS WITH HYPERGLYCEMIA: ICD-10-CM

## 2025-04-21 DIAGNOSIS — R97.20 BPH WITH ELEVATED PSA: ICD-10-CM

## 2025-04-21 DIAGNOSIS — Z12.11 COLON CANCER SCREENING: ICD-10-CM

## 2025-04-21 DIAGNOSIS — Z12.5 ENCOUNTER FOR SCREENING PROSTATE SPECIFIC ANTIGEN (PSA) MEASUREMENT: ICD-10-CM

## 2025-04-21 DIAGNOSIS — G47.33 OBSTRUCTIVE SLEEP APNEA SYNDROME: ICD-10-CM

## 2025-04-21 DIAGNOSIS — Z00.00 MEDICARE ANNUAL WELLNESS VISIT, SUBSEQUENT: Primary | ICD-10-CM

## 2025-04-21 DIAGNOSIS — I10 PRIMARY HYPERTENSION: ICD-10-CM

## 2025-04-21 DIAGNOSIS — E03.9 ACQUIRED HYPOTHYROIDISM: ICD-10-CM

## 2025-04-21 DIAGNOSIS — E78.5 DYSLIPIDEMIA: ICD-10-CM

## 2025-04-21 DIAGNOSIS — E55.9 VITAMIN D DEFICIENCY: ICD-10-CM

## 2025-04-21 LAB
HBA1C MFR BLD: 8 % (ref 4.2–6.5)
POC FINGERSTICK BLOOD GLUCOSE: 181 MG/DL (ref 70–100)

## 2025-04-21 PROCEDURE — 3008F BODY MASS INDEX DOCD: CPT | Performed by: FAMILY MEDICINE

## 2025-04-21 PROCEDURE — 3078F DIAST BP <80 MM HG: CPT | Performed by: FAMILY MEDICINE

## 2025-04-21 PROCEDURE — 3052F HG A1C>EQUAL 8.0%<EQUAL 9.0%: CPT | Performed by: FAMILY MEDICINE

## 2025-04-21 PROCEDURE — 1159F MED LIST DOCD IN RCRD: CPT | Performed by: FAMILY MEDICINE

## 2025-04-21 PROCEDURE — 1160F RVW MEDS BY RX/DR IN RCRD: CPT | Performed by: FAMILY MEDICINE

## 2025-04-21 PROCEDURE — 1123F ACP DISCUSS/DSCN MKR DOCD: CPT | Performed by: FAMILY MEDICINE

## 2025-04-21 PROCEDURE — 1036F TOBACCO NON-USER: CPT | Performed by: FAMILY MEDICINE

## 2025-04-21 PROCEDURE — 99214 OFFICE O/P EST MOD 30 MIN: CPT | Performed by: FAMILY MEDICINE

## 2025-04-21 PROCEDURE — 1126F AMNT PAIN NOTED NONE PRSNT: CPT | Performed by: FAMILY MEDICINE

## 2025-04-21 PROCEDURE — 3074F SYST BP LT 130 MM HG: CPT | Performed by: FAMILY MEDICINE

## 2025-04-21 PROCEDURE — G0439 PPPS, SUBSEQ VISIT: HCPCS | Performed by: FAMILY MEDICINE

## 2025-04-21 PROCEDURE — 1170F FXNL STATUS ASSESSED: CPT | Performed by: FAMILY MEDICINE

## 2025-04-21 PROCEDURE — 82962 GLUCOSE BLOOD TEST: CPT | Performed by: FAMILY MEDICINE

## 2025-04-21 PROCEDURE — 83036 HEMOGLOBIN GLYCOSYLATED A1C: CPT | Mod: CLIA WAIVED TEST | Performed by: FAMILY MEDICINE

## 2025-04-21 ASSESSMENT — ACTIVITIES OF DAILY LIVING (ADL)
MANAGING_FINANCES: INDEPENDENT
TAKING_MEDICATION: INDEPENDENT
GROCERY_SHOPPING: INDEPENDENT
DRESSING: INDEPENDENT
BATHING: INDEPENDENT
DOING_HOUSEWORK: INDEPENDENT

## 2025-04-21 ASSESSMENT — PATIENT HEALTH QUESTIONNAIRE - PHQ9
2. FEELING DOWN, DEPRESSED OR HOPELESS: NOT AT ALL
SUM OF ALL RESPONSES TO PHQ9 QUESTIONS 1 AND 2: 0
1. LITTLE INTEREST OR PLEASURE IN DOING THINGS: NOT AT ALL

## 2025-04-21 ASSESSMENT — ENCOUNTER SYMPTOMS
LOSS OF SENSATION IN FEET: 0
OCCASIONAL FEELINGS OF UNSTEADINESS: 0
DEPRESSION: 0

## 2025-04-21 ASSESSMENT — PAIN SCALES - GENERAL: PAINLEVEL_OUTOF10: 0-NO PAIN

## 2025-04-21 NOTE — PROGRESS NOTES
Subjective   Mack Alarcon is a 74 y.o. male who presents for Medicare wellness exam.    HPI  : Patient is a 74-year-old diabetic male who is in today for his Medicare wellness exam.  Patient will answer the Medicare questions as presented by the medical assistant.  He also will have complete blood work, urine check with microalbumin.  Review of medication and refills as needed.    Objective   :ROS :10 systems were reviewed and the information is included in the HPI and no additional review of systems is indicated.    Physical Exam  Vitals and nursing note reviewed.   Constitutional:       Appearance: Normal appearance. He is obese.      Comments: Patient is alert and oriented x3.   No acute distress   HENT:      Head: Normocephalic.      Right Ear: Tympanic membrane and external ear normal.      Left Ear: Tympanic membrane and external ear normal.      Ears:      Comments: Ears are patent bilaterally and TMs are clear.     Nose: Rhinorrhea present.      Comments: Nasal congestion and nasal Rhinorrhea.  Allergic  Rhinorrhea.      Mouth/Throat:      Mouth: Mucous membranes are moist.      Pharynx: Oropharynx is clear.      Comments: Mouth is moist, tongue is midline.  No posterior pharyngeal erythema.  Eyes:      Extraocular Movements: Extraocular movements intact.      Conjunctiva/sclera: Conjunctivae normal.      Pupils: Pupils are equal, round, and reactive to light.      Comments: No visual disturbance, does have his  eyes examined once a year.   Neck:      Comments:   Large  broad neck  due to body habitus . No carotid bruits, no thyromegaly, no cervical adenopathy.    Cardiovascular:      Rate and Rhythm: Normal rate and regular rhythm.      Pulses: Normal pulses.      Heart sounds: Normal heart sounds.      Comments: Patient denies chest pain and no palpitations.  Heart rhythm is stable S1 and S2 are noted, no ectopics.  Pulmonary:      Effort: Pulmonary effort is normal.      Breath sounds: Rhonchi present.       Comments: Allergic cough  but no wheezing with current weather change.  Lungs with few rhonchi.    Abdominal:      General: Bowel sounds are normal.      Palpations: Abdomen is soft.      Comments: Abdomen is soft and obese  but non tender.  No flank tenderness.  No suprapubic pain.  Positive bowel sounds .  Had left hernia repair  last year.   No abdominal guarding and no rebound tenderness.   Genitourinary:     Comments: Patient denies dysuria, no hematuria, denies flank pain.  Elevated PSA and had Prostate biopsy which was negative .  Musculoskeletal:         General: Tenderness present. Normal range of motion.      Cervical back: Normal range of motion. Tenderness present.      Comments: Left knee arthritis.  Age-related arthritis in the joints.  Restriction of motion cervical and lumbar spines due to arthritis, and secondary muscle spasm.   Skin:     General: Skin is warm and dry.      Comments: There is no bruising, no erythema, no skin lesions noted, no rashes.   Neurological:      General: No focal deficit present.      Mental Status: He is alert and oriented to person, place, and time. Mental status is at baseline.      Sensory: Sensory deficit present.      Comments: No focal neurosensory deficits are noted.  Patient does have occasional paresthesias and leg cramps in the lower extremities.  Coordination and gait are stable.  Normal muscle strength upper and lower extremities.   Psychiatric:         Behavior: Behavior normal.         Thought Content: Thought content normal.         Judgment: Judgment normal.      Comments: Patient has flat  mood and affect.  Thought content and judgment are stable.  Mild age related forgetfulness.   Behavior is normal.     PLAN : Patient is a 74-year-old male who was evaluated today for a Medicare wellness exam.  He did answer the questions as presented by the medical assistant.  He does not have a living will or medical power of  but he and his wife plan on  doing that soon.  Blood sugar today was 181 and the A1c was 8.0%.  Last visit it was 8.5% so it did improve.  He was unable to give us a urine today and will bring at next visit.  He will be notified of all his blood results in 3 days and further recommendations will be made at that time.  He is trying to watch his diet and walk more to lose weight.  Blood pressure and other vitals were stable.  Patient had his left groin hernia repaired last year and is stable.  He also has some issues with his left knee from degenerative arthritis.  Patient will try some topical Voltaren gel.  Patient also followed up with urology last year and did have a prostate biopsy which was negative for malignancy.  He otherwise is stable we will follow-up as needed pending the blood work results.    Problem List Items Addressed This Visit       Dyslipidemia    Hypothyroidism    Primary hypertension    Uncontrolled type 2 diabetes mellitus with hyperglycemia            Buzz Vazquez,

## 2025-04-22 LAB
25(OH)D3+25(OH)D2 SERPL-MCNC: 40 NG/ML (ref 30–100)
ALBUMIN SERPL-MCNC: 4.5 G/DL (ref 3.6–5.1)
ALP SERPL-CCNC: 76 U/L (ref 35–144)
ALT SERPL-CCNC: 17 U/L (ref 9–46)
ANION GAP SERPL CALCULATED.4IONS-SCNC: 22 MMOL/L (CALC) (ref 7–17)
AST SERPL-CCNC: 15 U/L (ref 10–35)
BILIRUB SERPL-MCNC: 0.7 MG/DL (ref 0.2–1.2)
BUN SERPL-MCNC: 22 MG/DL (ref 7–25)
CALCIUM SERPL-MCNC: 9.5 MG/DL (ref 8.6–10.3)
CHLORIDE SERPL-SCNC: 99 MMOL/L (ref 98–110)
CHOLEST SERPL-MCNC: 116 MG/DL
CHOLEST/HDLC SERPL: 3.2 (CALC)
CO2 SERPL-SCNC: 20 MMOL/L (ref 20–32)
CREAT SERPL-MCNC: 1.24 MG/DL (ref 0.7–1.28)
EGFRCR SERPLBLD CKD-EPI 2021: 61 ML/MIN/1.73M2
ERYTHROCYTE [DISTWIDTH] IN BLOOD BY AUTOMATED COUNT: 12.5 % (ref 11–15)
GLUCOSE SERPL-MCNC: 93 MG/DL (ref 65–99)
HCT VFR BLD AUTO: 43.6 % (ref 38.5–50)
HDLC SERPL-MCNC: 36 MG/DL
HGB BLD-MCNC: 14.2 G/DL (ref 13.2–17.1)
LDLC SERPL CALC-MCNC: 62 MG/DL (CALC)
MCH RBC QN AUTO: 28.4 PG (ref 27–33)
MCHC RBC AUTO-ENTMCNC: 32.6 G/DL (ref 32–36)
MCV RBC AUTO: 87.2 FL (ref 80–100)
NONHDLC SERPL-MCNC: 80 MG/DL (CALC)
PLATELET # BLD AUTO: 290 THOUSAND/UL (ref 140–400)
PMV BLD REES-ECKER: 9.9 FL (ref 7.5–12.5)
POTASSIUM SERPL-SCNC: 4.1 MMOL/L (ref 3.5–5.3)
PROT SERPL-MCNC: 7 G/DL (ref 6.1–8.1)
PSA SERPL-MCNC: 8.13 NG/ML
RBC # BLD AUTO: 5 MILLION/UL (ref 4.2–5.8)
SODIUM SERPL-SCNC: 141 MMOL/L (ref 135–146)
TRIGL SERPL-MCNC: 101 MG/DL
TSH SERPL-ACNC: 2.26 MIU/L (ref 0.4–4.5)
WBC # BLD AUTO: 6.9 THOUSAND/UL (ref 3.8–10.8)

## 2025-05-16 DIAGNOSIS — I10 ESSENTIAL (PRIMARY) HYPERTENSION: ICD-10-CM

## 2025-05-16 RX ORDER — AMLODIPINE BESYLATE AND ATORVASTATIN CALCIUM 5; 20 MG/1; MG/1
1 TABLET, FILM COATED ORAL DAILY
Qty: 90 TABLET | Refills: 3 | Status: SHIPPED | OUTPATIENT
Start: 2025-05-16

## 2025-07-02 DIAGNOSIS — I10 ESSENTIAL (PRIMARY) HYPERTENSION: ICD-10-CM

## 2025-07-02 RX ORDER — AMLODIPINE BESYLATE AND ATORVASTATIN CALCIUM 5; 20 MG/1; MG/1
1 TABLET, FILM COATED ORAL DAILY
Qty: 90 TABLET | Refills: 3 | Status: SHIPPED | OUTPATIENT
Start: 2025-07-02

## 2025-08-05 ENCOUNTER — APPOINTMENT (OUTPATIENT)
Dept: PRIMARY CARE | Facility: CLINIC | Age: 75
End: 2025-08-05
Payer: MEDICARE